# Patient Record
Sex: MALE | Race: OTHER
[De-identification: names, ages, dates, MRNs, and addresses within clinical notes are randomized per-mention and may not be internally consistent; named-entity substitution may affect disease eponyms.]

---

## 2021-10-11 ENCOUNTER — HOSPITAL ENCOUNTER (EMERGENCY)
Dept: HOSPITAL 54 - ER | Age: 36
Discharge: HOME | End: 2021-10-11
Payer: COMMERCIAL

## 2021-10-11 VITALS — BODY MASS INDEX: 27.28 KG/M2 | WEIGHT: 180 LBS | HEIGHT: 68 IN

## 2021-10-11 VITALS — DIASTOLIC BLOOD PRESSURE: 82 MMHG | SYSTOLIC BLOOD PRESSURE: 129 MMHG

## 2021-10-11 DIAGNOSIS — S61.512A: Primary | ICD-10-CM
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
## 2021-10-11 DIAGNOSIS — F41.9: ICD-10-CM

## 2021-10-11 PROCEDURE — 99283 EMERGENCY DEPT VISIT LOW MDM: CPT

## 2021-10-11 PROCEDURE — A6403 STERILE GAUZE>16 <= 48 SQ IN: HCPCS

## 2021-10-11 PROCEDURE — 90715 TDAP VACCINE 7 YRS/> IM: CPT

## 2021-10-11 PROCEDURE — 90471 IMMUNIZATION ADMIN: CPT

## 2021-10-11 PROCEDURE — 12001 RPR S/N/AX/GEN/TRNK 2.5CM/<: CPT

## 2021-10-11 NOTE — NUR
RECEIVED PT AMBULATORY, CUT SELF ACCIDENTALLY ON LEFT WRIST TO TENDON, MD 
ASSESSED, SITE CLEANSED AND PRESSURE APPLIED TO WOUND, SIGNIFICANT BLEEDING HAS 
STOPPED, WILL TREAT WITH GLUE AND BANDAGE AND PROVIDE TETANUS SHOT FOR 
PREVENTATIVE MEASURES. TOLERATING PAIN WELL NO PAIN MEDICATION ORDERS TO GIVE 
AT THIS TIME.

## 2021-10-11 NOTE — NUR
pt educated on importance of keeping wound site clean and dry, understood, 
ambulated on own out of ER and had no c/o and no adverse side effects noted to 
treatment, site intact with derma glue and gauze/ bandage intact.